# Patient Record
Sex: FEMALE | Race: WHITE | NOT HISPANIC OR LATINO | URBAN - METROPOLITAN AREA
[De-identification: names, ages, dates, MRNs, and addresses within clinical notes are randomized per-mention and may not be internally consistent; named-entity substitution may affect disease eponyms.]

---

## 2018-04-10 ENCOUNTER — OFFICE VISIT (OUTPATIENT)
Dept: URGENT CARE | Facility: CLINIC | Age: 33
End: 2018-04-10
Payer: COMMERCIAL

## 2018-04-10 VITALS
TEMPERATURE: 99.3 F | OXYGEN SATURATION: 98 % | DIASTOLIC BLOOD PRESSURE: 64 MMHG | HEART RATE: 85 BPM | RESPIRATION RATE: 16 BRPM | SYSTOLIC BLOOD PRESSURE: 114 MMHG | BODY MASS INDEX: 28.34 KG/M2 | WEIGHT: 187 LBS | HEIGHT: 68 IN

## 2018-04-10 DIAGNOSIS — H57.89 EYE IRRITATION: ICD-10-CM

## 2018-04-10 PROCEDURE — 99214 OFFICE O/P EST MOD 30 MIN: CPT | Performed by: FAMILY MEDICINE

## 2018-04-10 RX ORDER — TOBRAMYCIN 3 MG/ML
1 SOLUTION/ DROPS OPHTHALMIC 4 TIMES DAILY
Qty: 1 ML | Refills: 0 | Status: SHIPPED | OUTPATIENT
Start: 2018-04-10 | End: 2018-04-15

## 2018-04-10 RX ORDER — PREDNISONE 10 MG/1
30 TABLET ORAL EVERY MORNING
Qty: 21 TAB | Refills: 0 | Status: SHIPPED | OUTPATIENT
Start: 2018-04-10 | End: 2018-04-17

## 2018-04-10 RX ORDER — IBUPROFEN 200 MG
200 TABLET ORAL EVERY 6 HOURS PRN
COMMUNITY

## 2018-04-10 RX ORDER — DEXAMETHASONE SODIUM PHOSPHATE 4 MG/ML
8 INJECTION, SOLUTION INTRA-ARTICULAR; INTRALESIONAL; INTRAMUSCULAR; INTRAVENOUS; SOFT TISSUE ONCE
Status: COMPLETED | OUTPATIENT
Start: 2018-04-10 | End: 2018-04-10

## 2018-04-10 RX ADMIN — DEXAMETHASONE SODIUM PHOSPHATE 8 MG: 4 INJECTION, SOLUTION INTRA-ARTICULAR; INTRALESIONAL; INTRAMUSCULAR; INTRAVENOUS; SOFT TISSUE at 19:52

## 2018-04-10 ASSESSMENT — ENCOUNTER SYMPTOMS
DIZZINESS: 0
HEMOPTYSIS: 0
ORTHOPNEA: 0
SHORTNESS OF BREATH: 0
FEVER: 0
FOCAL WEAKNESS: 0
CHILLS: 0

## 2018-04-11 NOTE — PROGRESS NOTES
"Subjective:      Shanique Lincoln is a 32 y.o. female who presents with Eye Swelling ((R) eye x aprox a few ahours ago)    Chief Complaint   Patient presents with   • Eye Swelling     (R) eye x aprox a few ahours ago        - This is a very pleasant 32 y.o. female with complaints of Rt eye swelling irritation sudden onset today. No trauma, fb. Does use contacts           ALLERGIES:  Patient has no known allergies.     PMH:  No past medical history on file.     MEDS:    Current Outpatient Prescriptions:   •  DiphenhydrAMINE HCl (BENADRYL ALLERGY PO), Take  by mouth., Disp: , Rfl:   •  ibuprofen (MOTRIN) 200 MG Tab, Take 200 mg by mouth every 6 hours as needed., Disp: , Rfl:   •  Cetirizine HCl (ZYRTEC ALLERGY PO), Take  by mouth., Disp: , Rfl:   •  tobramycin (TOBREX) 0.3 % Solution ophthalmic solution, Place 1 Drop in right eye 4 times a day for 5 days., Disp: 1 mL, Rfl: 0  •  predniSONE (DELTASONE) 10 MG Tab, Take 3 Tabs by mouth every morning for 7 days., Disp: 21 Tab, Rfl: 0    Current Facility-Administered Medications:   •  dexamethasone (DECADRON) injection 8 mg, 8 mg, Intramuscular, Once, Mich Hernández M.D.    ** I have documented what I find to be significant in regards to past medical, social, family and surgical history  in my HPI or under PMH/PSH/FH review section, otherwise it is contributory **           HPI    Review of Systems   Constitutional: Negative for chills and fever.   Respiratory: Negative for hemoptysis and shortness of breath.    Cardiovascular: Negative for chest pain and orthopnea.   Neurological: Negative for dizziness and focal weakness.          Objective:     /64   Pulse 85   Temp 37.4 °C (99.3 °F)   Resp 16   Ht 1.721 m (5' 7.75\")   Wt 84.8 kg (187 lb)   SpO2 98%   BMI 28.64 kg/m²      Physical Exam   Constitutional: She appears well-developed. No distress.   HENT:   Head: Normocephalic and atraumatic.   Mouth/Throat: Oropharynx is clear and moist.   Eyes: Conjunctivae " are normal.   Neck: Neck supple.   Cardiovascular: Regular rhythm.    No murmur heard.  Pulmonary/Chest: Effort normal. No respiratory distress.   Neurological: She is alert. She exhibits normal muscle tone.   Skin: Skin is warm and dry.   Psychiatric: She has a normal mood and affect. Judgment normal.   Nursing note and vitals reviewed.  Rt eye: no photophobia, + clear tearing, + chemosis and mild injection, no gross fb/abrasions. Anterior chamber benign             Assessment/Plan:         1. Eye irritation  dexamethasone (DECADRON) injection 8 mg    tobramycin (TOBREX) 0.3 % Solution ophthalmic solution    predniSONE (DELTASONE) 10 MG Tab       - d/c contacts  - 1-2 day recheck w/ optometrist       Dx & d/c instructions discussed w/ patient and/or family members. Follow up w/ Prvt Dr or here in 1-2 days if not getting better, sooner if needed,  ER if worse and UC/PCP unavailable.        Possible side effects (i.e. Rash, GI upset/constipation, sedation, elevation of BP or sugars) of any medications given discussed.

## 2019-04-22 ENCOUNTER — HOSPITAL ENCOUNTER (OUTPATIENT)
Facility: MEDICAL CENTER | Age: 34
End: 2019-04-22
Attending: PHYSICIAN ASSISTANT
Payer: COMMERCIAL

## 2019-04-22 PROCEDURE — 87624 HPV HI-RISK TYP POOLED RSLT: CPT

## 2019-04-22 PROCEDURE — 88175 CYTOPATH C/V AUTO FLUID REDO: CPT

## 2019-04-25 LAB
CYTOLOGY REG CYTOL: ABNORMAL
HPV HR 12 DNA CVX QL NAA+PROBE: POSITIVE
HPV16 DNA SPEC QL NAA+PROBE: NEGATIVE
HPV18 DNA SPEC QL NAA+PROBE: NEGATIVE
SPECIMEN SOURCE: ABNORMAL

## 2019-07-26 ENCOUNTER — HOSPITAL ENCOUNTER (OUTPATIENT)
Facility: MEDICAL CENTER | Age: 34
End: 2019-07-26
Attending: PHYSICIAN ASSISTANT
Payer: COMMERCIAL

## 2019-07-26 LAB — PATHOLOGY CONSULT NOTE: NORMAL

## 2019-07-26 PROCEDURE — 88305 TISSUE EXAM BY PATHOLOGIST: CPT

## 2020-02-26 ENCOUNTER — HOSPITAL ENCOUNTER (OUTPATIENT)
Dept: LAB | Facility: MEDICAL CENTER | Age: 35
End: 2020-02-26
Attending: PHYSICIAN ASSISTANT
Payer: COMMERCIAL

## 2020-02-26 PROCEDURE — 87624 HPV HI-RISK TYP POOLED RSLT: CPT

## 2020-02-26 PROCEDURE — 88175 CYTOPATH C/V AUTO FLUID REDO: CPT

## 2021-10-14 ENCOUNTER — APPOINTMENT (RX ONLY)
Dept: URBAN - METROPOLITAN AREA CLINIC 22 | Facility: CLINIC | Age: 36
Setting detail: DERMATOLOGY
End: 2021-10-14

## 2021-10-14 DIAGNOSIS — Z71.89 OTHER SPECIFIED COUNSELING: ICD-10-CM

## 2021-10-14 DIAGNOSIS — Q826 OTHER SPECIFIED ANOMALIES OF SKIN: ICD-10-CM

## 2021-10-14 DIAGNOSIS — B00.1 HERPESVIRAL VESICULAR DERMATITIS: ICD-10-CM

## 2021-10-14 DIAGNOSIS — L259 CONTACT DERMATITIS AND OTHER ECZEMA, UNSPECIFIED CAUSE: ICD-10-CM | Status: INADEQUATELY CONTROLLED

## 2021-10-14 DIAGNOSIS — D22 MELANOCYTIC NEVI: ICD-10-CM

## 2021-10-14 DIAGNOSIS — Q828 OTHER SPECIFIED ANOMALIES OF SKIN: ICD-10-CM

## 2021-10-14 DIAGNOSIS — D18.0 HEMANGIOMA: ICD-10-CM

## 2021-10-14 DIAGNOSIS — Q819 OTHER SPECIFIED ANOMALIES OF SKIN: ICD-10-CM

## 2021-10-14 DIAGNOSIS — L81.4 OTHER MELANIN HYPERPIGMENTATION: ICD-10-CM

## 2021-10-14 PROBLEM — L85.8 OTHER SPECIFIED EPIDERMAL THICKENING: Status: ACTIVE | Noted: 2021-10-14

## 2021-10-14 PROBLEM — D18.01 HEMANGIOMA OF SKIN AND SUBCUTANEOUS TISSUE: Status: ACTIVE | Noted: 2021-10-14

## 2021-10-14 PROBLEM — L30.8 OTHER SPECIFIED DERMATITIS: Status: ACTIVE | Noted: 2021-10-14

## 2021-10-14 PROBLEM — D22.5 MELANOCYTIC NEVI OF TRUNK: Status: ACTIVE | Noted: 2021-10-14

## 2021-10-14 PROCEDURE — ? ADDITIONAL NOTES

## 2021-10-14 PROCEDURE — ? TREATMENT REGIMEN

## 2021-10-14 PROCEDURE — ? PRESCRIPTION

## 2021-10-14 PROCEDURE — ? COUNSELING

## 2021-10-14 PROCEDURE — 99204 OFFICE O/P NEW MOD 45 MIN: CPT

## 2021-10-14 PROCEDURE — ? SUNSCREEN RECOMMENDATIONS

## 2021-10-14 RX ORDER — VALACYCLOVIR 500 MG/1
1 TABLET, FILM COATED ORAL TID
Qty: 90 | Refills: 3 | Status: ERX | COMMUNITY
Start: 2021-10-14

## 2021-10-14 RX ORDER — TRIAMCINOLONE ACETONIDE 1 MG/G
CREAM TOPICAL BID
Qty: 45 | Refills: 1 | Status: ERX | COMMUNITY
Start: 2021-10-14

## 2021-10-14 RX ADMIN — TRIAMCINOLONE ACETONIDE: 1 CREAM TOPICAL at 00:00

## 2021-10-14 RX ADMIN — VALACYCLOVIR 1: 500 TABLET, FILM COATED ORAL at 00:00

## 2021-10-14 ASSESSMENT — LOCATION DETAILED DESCRIPTION DERM
LOCATION DETAILED: LEFT PROXIMAL DORSAL FOREARM
LOCATION DETAILED: RIGHT PROXIMAL PRETIBIAL REGION
LOCATION DETAILED: RIGHT ANTERIOR DISTAL THIGH
LOCATION DETAILED: LEFT INFERIOR VERMILION LIP
LOCATION DETAILED: RIGHT DISTAL DORSAL FOREARM
LOCATION DETAILED: RIGHT DISTAL DORSAL FOREARM
LOCATION DETAILED: LEFT SUPERIOR VERMILION LIP
LOCATION DETAILED: LEFT ANTERIOR DISTAL THIGH
LOCATION DETAILED: LEFT PROXIMAL PRETIBIAL REGION
LOCATION DETAILED: EPIGASTRIC SKIN
LOCATION DETAILED: LEFT SUPERIOR MEDIAL UPPER BACK
LOCATION DETAILED: LEFT DISTAL DORSAL FOREARM

## 2021-10-14 ASSESSMENT — LOCATION SIMPLE DESCRIPTION DERM
LOCATION SIMPLE: LEFT LIP
LOCATION SIMPLE: RIGHT FOREARM
LOCATION SIMPLE: RIGHT PRETIBIAL REGION
LOCATION SIMPLE: RIGHT THIGH
LOCATION SIMPLE: LEFT THIGH
LOCATION SIMPLE: RIGHT FOREARM
LOCATION SIMPLE: ABDOMEN
LOCATION SIMPLE: LEFT PRETIBIAL REGION
LOCATION SIMPLE: LEFT FOREARM
LOCATION SIMPLE: LEFT FOREARM
LOCATION SIMPLE: LEFT UPPER BACK

## 2021-10-14 ASSESSMENT — LOCATION ZONE DERM
LOCATION ZONE: ARM
LOCATION ZONE: LEG
LOCATION ZONE: TRUNK
LOCATION ZONE: ARM
LOCATION ZONE: LIP

## 2021-10-14 NOTE — HPI: INFECTION (HERPES SIMPLEX)
How Severe Is It?: moderate
Is This A New Presentation, Or A Follow-Up?: Follow Up Herpes Simplex
Additional History: Reports recurring cold sores 1-2 x monthly

## 2021-10-14 NOTE — PROCEDURE: ADDITIONAL NOTES
Detail Level: Zone
Additional Notes: Due to recurring cold sore she will take Valacyclovir daily.
Render Risk Assessment In Note?: no

## 2021-11-19 ENCOUNTER — OFFICE VISIT (OUTPATIENT)
Dept: MEDICAL GROUP | Facility: CLINIC | Age: 36
End: 2021-11-19
Payer: COMMERCIAL

## 2021-11-19 VITALS
BODY MASS INDEX: 38.49 KG/M2 | HEIGHT: 68 IN | WEIGHT: 254 LBS | SYSTOLIC BLOOD PRESSURE: 131 MMHG | RESPIRATION RATE: 16 BRPM | DIASTOLIC BLOOD PRESSURE: 86 MMHG | HEART RATE: 97 BPM | TEMPERATURE: 97.6 F | OXYGEN SATURATION: 93 %

## 2021-11-19 DIAGNOSIS — Z13.9 SCREENING DUE: ICD-10-CM

## 2021-11-19 PROCEDURE — 99213 OFFICE O/P EST LOW 20 MIN: CPT | Mod: GE

## 2021-11-19 RX ORDER — FLUOXETINE HYDROCHLORIDE 20 MG/1
CAPSULE ORAL
COMMUNITY
Start: 2021-09-08 | End: 2021-11-19

## 2021-11-19 RX ORDER — BUPROPION HYDROCHLORIDE 300 MG/1
TABLET ORAL
COMMUNITY
Start: 2021-10-12 | End: 2021-11-19

## 2021-11-19 RX ORDER — VALACYCLOVIR HYDROCHLORIDE 500 MG/1
500 TABLET, FILM COATED ORAL 2 TIMES DAILY
Qty: 40 TABLET | Refills: 0 | Status: SHIPPED | OUTPATIENT
Start: 2021-11-19

## 2021-11-19 RX ORDER — BUPROPION HYDROCHLORIDE 300 MG/1
300 TABLET ORAL EVERY MORNING
Qty: 20 TABLET | Refills: 0 | Status: SHIPPED | OUTPATIENT
Start: 2021-11-19

## 2021-11-19 RX ORDER — FLUOXETINE HYDROCHLORIDE 20 MG/1
20 CAPSULE ORAL DAILY
Qty: 30 CAPSULE | Refills: 3 | Status: SHIPPED | OUTPATIENT
Start: 2021-11-19 | End: 2022-04-21

## 2021-11-19 RX ORDER — FLUTICASONE PROPIONATE 44 MCG
2 AEROSOL WITH ADAPTER (GRAM) INHALATION 2 TIMES DAILY
Qty: 1 EACH | Refills: 3 | Status: SHIPPED | OUTPATIENT
Start: 2021-11-19

## 2021-11-19 RX ORDER — TRIAMCINOLONE ACETONIDE 1 MG/G
CREAM TOPICAL
COMMUNITY
Start: 2021-10-14 | End: 2021-11-19

## 2021-11-19 RX ORDER — ATORVASTATIN CALCIUM 40 MG/1
TABLET, FILM COATED ORAL
COMMUNITY
Start: 2021-09-08 | End: 2021-11-19

## 2021-11-19 RX ORDER — TRIAMCINOLONE ACETONIDE 1 MG/G
1 CREAM TOPICAL 2 TIMES DAILY
Qty: 1 EACH | Refills: 3 | Status: SHIPPED | OUTPATIENT
Start: 2021-11-19

## 2021-11-19 RX ORDER — FLUTICASONE PROPIONATE 44 UG/1
2 AEROSOL, METERED RESPIRATORY (INHALATION) 2 TIMES DAILY
COMMUNITY
End: 2021-11-19

## 2021-11-19 RX ORDER — ALBUTEROL SULFATE 90 UG/1
2 AEROSOL, METERED RESPIRATORY (INHALATION) EVERY 6 HOURS PRN
COMMUNITY
End: 2021-11-19

## 2021-11-19 RX ORDER — ALBUTEROL SULFATE 90 UG/1
2 AEROSOL, METERED RESPIRATORY (INHALATION) EVERY 6 HOURS PRN
Qty: 8.5 G | Refills: 3 | Status: SHIPPED | OUTPATIENT
Start: 2021-11-19 | End: 2023-04-18

## 2021-11-19 RX ORDER — BUPROPION HYDROCHLORIDE 300 MG/1
300 TABLET ORAL EVERY MORNING
Qty: 30 TABLET | Refills: 3 | Status: SHIPPED | OUTPATIENT
Start: 2021-11-19 | End: 2022-04-21

## 2021-11-19 RX ORDER — VALACYCLOVIR HYDROCHLORIDE 500 MG/1
TABLET, FILM COATED ORAL
COMMUNITY
Start: 2021-10-14 | End: 2021-11-19

## 2021-11-19 RX ORDER — ATORVASTATIN CALCIUM 40 MG/1
40 TABLET, FILM COATED ORAL DAILY
Qty: 30 TABLET | Refills: 3 | Status: SHIPPED | OUTPATIENT
Start: 2021-11-19 | End: 2022-03-21

## 2021-11-19 NOTE — PROGRESS NOTES
Subjective:     CC: No chief complaint on file.      HPI:   Diana Lincoln is a 36 y.o. female who presents to the clinic today for medication refill.  Patient's only main complaint today is that she feels the efficacy of her antidepressant medication is tapering off.  Patient states that she feels less involved in things that she would normally be more involved in.  Denies any suicidal ideation at this time.  Denies any thoughts of self-harm.  Patient is currently taking 300 mg of Wellbutrin as well as 20 mg of Prozac.  Patient would like these medications refilled at this time, denies the need for any dosage changes at this time.  Patient is not currently seeing a therapist, states that she does not feel that she can continue to get value out of the things she already knows how to do at home.  Discussed that if the symptoms continue or worsen, patient should reach out and make an appointment to discuss any medication changes or adjustments.  Patient agreeable to this plan of care.  Medication refill sent to patient's preferred pharmacy    OB History   No obstetric history on file.      She  has no history on file for sexual activity.    She  has no past medical history on file.  She  has no past surgical history on file.    No family history on file.  Social History     Tobacco Use   • Smoking status: Unknown If Ever Smoked   • Smokeless tobacco: Never Used   Substance Use Topics   • Alcohol use: Not on file   • Drug use: Not on file       There are no problems to display for this patient.    Current Outpatient Medications   Medication Sig Dispense Refill   • DiphenhydrAMINE HCl (BENADRYL ALLERGY PO) Take  by mouth.     • ibuprofen (MOTRIN) 200 MG Tab Take 200 mg by mouth every 6 hours as needed.     • Cetirizine HCl (ZYRTEC ALLERGY PO) Take  by mouth.       No current facility-administered medications for this visit.     No Known Allergies    Review of Systems  Constitutional: Negative for fever, chills and  malaise/fatigue.   HENT: Negative for congestion.    Eyes: Negative for pain.   Respiratory: Negative for cough and shortness of breath.    Cardiovascular: Negative for chest pain and leg swelling.   Gastrointestinal: Negative for nausea, vomiting, abdominal pain and diarrhea.   Genitourinary: Negative for dysuria and hematuria.   Skin: Negative for rash.   Neurological: Negative for dizziness, focal weakness and headaches.   Endo/Heme/Allergies: Does not bruise/bleed easily.   Psychiatric/Behavioral: Negative for depression.  The patient is not nervous/anxious.      Objective:   There were no vitals taken for this visit.    Wt Readings from Last 4 Encounters:   04/10/18 84.8 kg (187 lb)       Physical Exam:  Constitutional: Well-developed and well-nourished. Not diaphoretic. No distress.   Skin: Skin is warm and dry. No rash noted.  Head: Atraumatic without lesions.  Eyes: Conjunctivae and extraocular motions are normal. Pupils are equal, round, and reactive to light. No scleral icterus.   Ears:  External ears unremarkable. Tympanic membranes clear and intact.  Nose: Nares patent. Septum midline. Turbinates without erythema nor edema. No discharge.   Mouth/Throat: Tongue normal. Oropharynx is clear and moist. Posterior pharynx without erythema or exudates.  Neck: Supple, trachea midline. Normal range of motion. No thyromegaly present. No lymphadenopathy--cervical or supraclavicular.  Cardiovascular: Regular rate and rhythm, S1 and S2 without murmur, rubs, or gallops.    Respiratory: Effort normal. Clear to auscultation throughout. No adventitious sounds.   Abdomen: Soft, non tender, and without distention. Active bowel sounds in all four quadrants. No rebound, guarding, masses or HSM.  Extremities: No cyanosis, clubbing, erythema, nor edema. Distal pulses intact and symmetric.   Musculoskeletal: All major joints AROM full in all directions without pain.  Neurological: Alert and oriented x 3. Grossly non-focal.  Strength and sensation grossly intact. DTRs 2+/3 and symmetric.   Psychiatric:  Behavior, mood, and affect are appropriate.    Assessment and Plan:     #Depression  Moderately well controlled with both Wellbutrin 300 mg as well as Prozac 20 mg  Will discussed possible medication changes, dosage changes during her next visit if symptoms persist as stated in HPI  Medication sent to patient's preferred pharmacy    Follow-up: 6 months    I discussed this patient with the resident physician at the time of the encounter. I have reviewed the note and I agree with the findings, assessments and plans.

## 2021-12-03 ENCOUNTER — TELEPHONE (OUTPATIENT)
Dept: SCHEDULING | Facility: IMAGING CENTER | Age: 36
End: 2021-12-03

## 2021-12-17 ENCOUNTER — OFFICE VISIT (OUTPATIENT)
Dept: MEDICAL GROUP | Facility: CLINIC | Age: 36
End: 2021-12-17
Payer: COMMERCIAL

## 2021-12-17 VITALS
DIASTOLIC BLOOD PRESSURE: 82 MMHG | HEIGHT: 68 IN | TEMPERATURE: 98.6 F | HEART RATE: 108 BPM | WEIGHT: 252 LBS | BODY MASS INDEX: 38.19 KG/M2 | SYSTOLIC BLOOD PRESSURE: 117 MMHG | OXYGEN SATURATION: 95 % | RESPIRATION RATE: 16 BRPM

## 2021-12-17 DIAGNOSIS — Z30.430 ENCOUNTER FOR IUD INSERTION: ICD-10-CM

## 2021-12-17 PROCEDURE — 58300 INSERT INTRAUTERINE DEVICE: CPT | Mod: GC | Performed by: FAMILY MEDICINE

## 2021-12-17 NOTE — PROCEDURES
"CC: desires IUD insertion    HPI:  36 y.o. No obstetric history on file. presents today for desired IUD insertion.  Pt today desires Mirena IUD.      Current contraception: IUD    Was removed easily prior to insertion of IUD.      /82   Pulse (!) 108   Temp 37 °C (98.6 °F) (Tympanic)   Resp 16   Ht 1.727 m (5' 8\")   Wt 114 kg (252 lb)   SpO2 95%   BMI 38.32 kg/m²   IUD Insertion:  The bimanual exam is performed the uterus is noted to be 6 weeks in size and is anteverted  A speculum was inserted into the vagina, the cervix was cleansed with Betadine swabs x3  Tenaculum was placed on the anterior lip of the cervix  The IUD was loaded into   The uterus was sounded to a depth of 7cm  The IUD was released into the uterus.    The strings trimmed to approximately 2 cm  Tenaculum was removed from the cervix and hemostasis was noted.  The patient tolerated the procedure well        A/P: 36 y.o. No obstetric history on file. s/p mirena IUD insertion as above  - prior to insertion, risks of IUD reviewed with pt including risk of insertion including pain, bleeding, infection, uterine perforation (approximately 1.4/1000) and possible need for additional procedure for removal, as well as risks of IUD including pregnancy and of pregnancies with IUD increased relative risk of ectopic pregnancy (absolute risk of ectopic decreased given decreased risk of pregnancy).  Also discussed likely changes to menstrual cycle with mirena IUD including decreased, irregular or absent menses.   All questions answered, consent signed.    - reviewed need for backup contraception for intercourse within 7 days    Plan for f/u as needed for IUD check in 1mos, earlier if concerns.    Ag Diana M.D.     The attending physician, Dr. Gamboa, was present for the entire procedure.    "

## 2022-01-12 ENCOUNTER — OFFICE VISIT (OUTPATIENT)
Dept: MEDICAL GROUP | Facility: CLINIC | Age: 37
End: 2022-01-12
Payer: COMMERCIAL

## 2022-01-12 ENCOUNTER — HOSPITAL ENCOUNTER (OUTPATIENT)
Facility: MEDICAL CENTER | Age: 37
End: 2022-01-12
Payer: COMMERCIAL

## 2022-01-12 VITALS
HEIGHT: 68 IN | TEMPERATURE: 97 F | DIASTOLIC BLOOD PRESSURE: 85 MMHG | SYSTOLIC BLOOD PRESSURE: 129 MMHG | HEART RATE: 94 BPM | OXYGEN SATURATION: 95 % | RESPIRATION RATE: 16 BRPM | WEIGHT: 251 LBS | BODY MASS INDEX: 38.04 KG/M2

## 2022-01-12 DIAGNOSIS — Z12.4 SCREENING FOR CERVICAL CANCER: ICD-10-CM

## 2022-01-12 PROCEDURE — 88175 CYTOPATH C/V AUTO FLUID REDO: CPT

## 2022-01-12 PROCEDURE — 87491 CHLMYD TRACH DNA AMP PROBE: CPT

## 2022-01-12 PROCEDURE — 87624 HPV HI-RISK TYP POOLED RSLT: CPT

## 2022-01-12 PROCEDURE — 99213 OFFICE O/P EST LOW 20 MIN: CPT | Mod: GE

## 2022-01-12 PROCEDURE — 87591 N.GONORRHOEAE DNA AMP PROB: CPT

## 2022-01-16 LAB
C TRACH DNA GENITAL QL NAA+PROBE: NEGATIVE
CYTOLOGY REG CYTOL: NORMAL
HPV HR 12 DNA CVX QL NAA+PROBE: NEGATIVE
HPV16 DNA SPEC QL NAA+PROBE: NEGATIVE
HPV18 DNA SPEC QL NAA+PROBE: NEGATIVE
N GONORRHOEA DNA GENITAL QL NAA+PROBE: NEGATIVE
SPECIMEN SOURCE: NORMAL
SPECIMEN SOURCE: NORMAL

## 2022-01-17 NOTE — PROGRESS NOTES
Subjective:     CC:   Chief Complaint   Patient presents with   • Annual Exam     pap smear        HPI:   Diana Lincoln is a 36 y.o. female who presents for a scheduled procedure, pap smear. Kindly see procedure note. Patient tolerated procedure well with no complications. Patient has no concerns at this time. Recently placed Mirena IUD, visible during exam and in place.       OB History   No obstetric history on file.      She  reports being sexually active and has had partner(s) who are male. She reports using the following method of birth control/protection: I.U.D..    She  has no past medical history on file.  She  has no past surgical history on file.    No family history on file.  Social History     Tobacco Use   • Smoking status: Never Smoker   • Smokeless tobacco: Never Used   Vaping Use   • Vaping Use: Never used   Substance Use Topics   • Alcohol use: Yes     Alcohol/week: 9.6 oz     Types: 8 Glasses of wine, 8 Cans of beer per week   • Drug use: Yes     Types: Marijuana       There are no problems to display for this patient.    Current Outpatient Medications   Medication Sig Dispense Refill   • atorvastatin (LIPITOR) 40 MG Tab Take 1 Tablet by mouth every day. 30 Tablet 3   • FLUoxetine (PROZAC) 20 MG Cap Take 1 Capsule by mouth every day. 30 Capsule 3   • triamcinolone acetonide (KENALOG) 0.1 % Cream Apply 1 Each topically 2 times a day. 1 Each 3   • buPROPion (WELLBUTRIN XL) 300 MG XL tablet Take 1 Tablet by mouth every morning. 30 Tablet 3   • fluticasone (FLOVENT HFA) 44 MCG/ACT Aerosol Inhale 2 Puffs 2 times a day. 1 Each 3   • albuterol 108 (90 Base) MCG/ACT Aero Soln inhalation aerosol Inhale 2 Puffs every 6 hours as needed for Shortness of Breath. 8.5 g 3   • valACYclovir (VALTREX) 500 MG Tab Take 1 Tablet by mouth 2 times a day. 40 Tablet 0   • buPROPion (WELLBUTRIN XL) 300 MG XL tablet Take 1 Tablet by mouth every morning. 20 Tablet 0   • DiphenhydrAMINE HCl (BENADRYL ALLERGY PO) Take  by  "mouth.     • ibuprofen (MOTRIN) 200 MG Tab Take 200 mg by mouth every 6 hours as needed.     • Cetirizine HCl (ZYRTEC ALLERGY PO) Take  by mouth.       No current facility-administered medications for this visit.     No Known Allergies    Review of Systems  Constitutional: Negative for fever, chills and malaise/fatigue.   HENT: Negative for congestion.    Eyes: Negative for pain.   Respiratory: Negative for cough and shortness of breath.    Cardiovascular: Negative for chest pain and leg swelling.   Gastrointestinal: Negative for nausea, vomiting, abdominal pain and diarrhea.   Genitourinary: Negative for dysuria and hematuria.   Skin: Negative for rash.   Neurological: Negative for dizziness, focal weakness and headaches.   Endo/Heme/Allergies: Does not bruise/bleed easily.   Psychiatric/Behavioral: Negative for depression.  The patient is not nervous/anxious.      Objective:   /85   Pulse 94   Temp 36.1 °C (97 °F)   Resp 16   Ht 1.727 m (5' 8\")   Wt 114 kg (251 lb)   SpO2 95%   BMI 38.16 kg/m²     Wt Readings from Last 4 Encounters:   01/12/22 114 kg (251 lb)   12/17/21 114 kg (252 lb)   11/19/21 115 kg (254 lb)   04/10/18 84.8 kg (187 lb)     Chaperone present.    Physical Exam:  Constitutional: Well-developed and well-nourished. Not diaphoretic. No distress.   Skin: Skin is warm and dry. No rash noted.  Head: Atraumatic without lesions.  Eyes: Conjunctivae and extraocular motions are normal. Pupils are equal, round, and reactive to light. No scleral icterus.   Ears:  External ears unremarkable. Tympanic membranes clear and intact.  Nose: Nares patent. Septum midline. Turbinates without erythema nor edema. No discharge.   Mouth/Throat: Tongue normal. Oropharynx is clear and moist. Posterior pharynx without erythema or exudates.  Neck: Supple, trachea midline. Normal range of motion. No thyromegaly present. No lymphadenopathy--cervical or supraclavicular.  Cardiovascular: Regular rate and rhythm, S1 " and S2 without murmur, rubs, or gallops.    Respiratory: Effort normal. Clear to auscultation throughout. No adventitious sounds.   Abdomen: Soft, non tender, and without distention. Active bowel sounds in all four quadrants. No rebound, guarding, masses or HSM.  Extremities: No cyanosis, clubbing, erythema, nor edema. Distal pulses intact and symmetric.   Psychiatric:  Behavior, mood, and affect are appropriate.    Assessment and Plan:       This is a healthy 36 year old female who presents to the clinic for a scheduled pap smear, and no additional concerns. Kindly refer to procedure note. Patient tolerated procedure well, no complications.     Plan: Follow up with results of   1. Screening for cervical cancer  - PAP LB, CT-NG, HPV 16&18      Health maintenance:  Next visit in 6 months  Labs per orders  Patient counseled about skin care, diet, supplements, and exercise.  Discussed possible vaginal spotting following procedure.    Follow-up: 6 months

## 2022-01-17 NOTE — PROCEDURES
Procedure: Pap smear  Indications: Screening for cervical cancer  Performing: Anat Vasquez  The external genitalia was within normal limits.  Sterile speculum was inserted and the cervix was easily visualized.  IUD strings visible. Cervix was normal in appearance.  Specimen was obtained using brush and broom.  Speculum was removed and bimanual exam was performed.  There was no cervical motion tenderness.  There were no masses palpable.  Patient tolerated the procedure well.

## 2022-03-21 RX ORDER — ATORVASTATIN CALCIUM 40 MG/1
TABLET, FILM COATED ORAL
Qty: 30 TABLET | Refills: 11 | Status: SHIPPED | OUTPATIENT
Start: 2022-03-21 | End: 2023-05-02

## 2022-04-21 RX ORDER — BUPROPION HYDROCHLORIDE 300 MG/1
TABLET ORAL
Qty: 30 TABLET | Refills: 11 | Status: SHIPPED | OUTPATIENT
Start: 2022-04-21 | End: 2023-05-04 | Stop reason: SDUPTHER

## 2022-04-21 RX ORDER — FLUOXETINE HYDROCHLORIDE 20 MG/1
CAPSULE ORAL
Qty: 30 CAPSULE | Refills: 11 | Status: SHIPPED | OUTPATIENT
Start: 2022-04-21 | End: 2023-05-04 | Stop reason: SDUPTHER

## 2023-04-18 RX ORDER — ALBUTEROL SULFATE 90 UG/1
AEROSOL, METERED RESPIRATORY (INHALATION)
Qty: 8.5 G | Refills: 10 | Status: SHIPPED | OUTPATIENT
Start: 2023-04-18

## 2023-04-18 NOTE — TELEPHONE ENCOUNTER
Received request via: Pharmacy    Was the patient seen in the last year in this department? No    Does the patient have an active prescription (recently filled or refills available) for medication(s) requested? No    Does the patient have USP Plus and need 100 day supply (blood pressure, diabetes and cholesterol meds only)? Patient does not have SCP

## 2023-05-02 RX ORDER — ATORVASTATIN CALCIUM 40 MG/1
TABLET, FILM COATED ORAL
Qty: 30 TABLET | Refills: 11 | Status: SHIPPED | OUTPATIENT
Start: 2023-05-02

## 2023-05-05 RX ORDER — BUPROPION HYDROCHLORIDE 300 MG/1
300 TABLET ORAL EVERY MORNING
Qty: 30 TABLET | Refills: 11 | Status: SHIPPED | OUTPATIENT
Start: 2023-05-05

## 2023-05-05 RX ORDER — FLUOXETINE HYDROCHLORIDE 20 MG/1
20 CAPSULE ORAL DAILY
Qty: 30 CAPSULE | Refills: 11 | Status: SHIPPED | OUTPATIENT
Start: 2023-05-05